# Patient Record
Sex: FEMALE | Race: BLACK OR AFRICAN AMERICAN | NOT HISPANIC OR LATINO | Employment: FULL TIME | URBAN - METROPOLITAN AREA
[De-identification: names, ages, dates, MRNs, and addresses within clinical notes are randomized per-mention and may not be internally consistent; named-entity substitution may affect disease eponyms.]

---
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
## 2019-08-08 ENCOUNTER — OFFICE VISIT (OUTPATIENT)
Dept: INTERNAL MEDICINE | Facility: CLINIC | Age: 19
End: 2019-08-08
Payer: COMMERCIAL

## 2019-08-08 VITALS
WEIGHT: 205 LBS | SYSTOLIC BLOOD PRESSURE: 122 MMHG | RESPIRATION RATE: 16 BRPM | DIASTOLIC BLOOD PRESSURE: 72 MMHG | TEMPERATURE: 98.2 F | BODY MASS INDEX: 30.36 KG/M2 | HEIGHT: 69 IN | HEART RATE: 66 BPM | OXYGEN SATURATION: 99 %

## 2019-08-08 DIAGNOSIS — N64.59 BREAST SYMPTOM: Primary | ICD-10-CM

## 2019-08-08 PROCEDURE — 99213 OFFICE O/P EST LOW 20 MIN: CPT | Performed by: PHYSICIAN ASSISTANT

## 2019-08-08 ASSESSMENT — MIFFLIN-ST. JEOR: SCORE: 1751.31

## 2019-08-08 NOTE — PROGRESS NOTES
"Subjective     La Whyte is a 19 year old female who presents to clinic today for the following health issues:    HPI   Breast bumps       Duration: 2 days    Description (location/character/radiation): Right breast around areola, pain and discomfort    She denies fever     Intensity:  mild    Accompanying signs and symptoms: They are painful    History (similar episodes/previous evaluation): None    Precipitating or alleviating factors: None    Therapies tried and outcome: None       Reviewed and updated as needed this visit by Provider  Meds  Problems             Objective    /72   Pulse 66   Temp 98.2  F (36.8  C) (Oral)   Resp 16   Ht 1.74 m (5' 8.5\")   Wt 93 kg (205 lb)   LMP 07/25/2019 (Approximate)   SpO2 99%   BMI 30.72 kg/m    Body mass index is 30.72 kg/m .  Physical Exam   GENERAL: healthy, alert and no distress  BREAST: right breast examined, small area about an M&M in size in area areola felt that is soft, mobile and painful to touch, no erythema or warmth    Diagnostic Test Results:  Labs reviewed in Epic        Assessment & Plan     1. Breast symptom  - reviewed case with MD in clinic  - plan for cool compresses and NSAIDs with close monitoring over the next week  - if no resolution, proceed with US  -  Reviewed symptoms to watch for if this occurs or symptoms worsen, urgent follow up       Return in about 1 week (around 8/15/2019), or if symptoms worsen or fail to improve, for breast symptoms .    Maya Ortega PA-C  [facility]    "

## 2019-11-08 ENCOUNTER — HEALTH MAINTENANCE LETTER (OUTPATIENT)
Age: 19
End: 2019-11-08

## 2020-02-23 ENCOUNTER — HEALTH MAINTENANCE LETTER (OUTPATIENT)
Age: 20
End: 2020-02-23

## 2020-03-01 ENCOUNTER — OFFICE VISIT (OUTPATIENT)
Dept: FAMILY MEDICINE | Facility: CLINIC | Age: 20
End: 2020-03-01
Payer: COMMERCIAL

## 2020-03-01 VITALS
HEART RATE: 74 BPM | SYSTOLIC BLOOD PRESSURE: 116 MMHG | TEMPERATURE: 98.1 F | BODY MASS INDEX: 28.29 KG/M2 | WEIGHT: 191 LBS | HEIGHT: 69 IN | OXYGEN SATURATION: 98 % | DIASTOLIC BLOOD PRESSURE: 82 MMHG

## 2020-03-01 DIAGNOSIS — F41.9 ANXIETY: ICD-10-CM

## 2020-03-01 DIAGNOSIS — R12 HEARTBURN: ICD-10-CM

## 2020-03-01 DIAGNOSIS — R53.83 FATIGUE, UNSPECIFIED TYPE: Primary | ICD-10-CM

## 2020-03-01 DIAGNOSIS — R11.0 NAUSEA: ICD-10-CM

## 2020-03-01 LAB — HEMOGLOBIN: 13.2 G/DL (ref 11.7–15.7)

## 2020-03-01 PROCEDURE — 99214 OFFICE O/P EST MOD 30 MIN: CPT | Performed by: PHYSICIAN ASSISTANT

## 2020-03-01 PROCEDURE — 36415 COLL VENOUS BLD VENIPUNCTURE: CPT | Performed by: PHYSICIAN ASSISTANT

## 2020-03-01 RX ORDER — ONDANSETRON 4 MG/1
4 TABLET, ORALLY DISINTEGRATING ORAL ONCE
Status: COMPLETED | OUTPATIENT
Start: 2020-03-01 | End: 2020-03-01

## 2020-03-01 RX ADMIN — ONDANSETRON 4 MG: 4 TABLET, ORALLY DISINTEGRATING ORAL at 12:21

## 2020-03-01 ASSESSMENT — MIFFLIN-ST. JEOR: SCORE: 1695.75

## 2020-03-01 NOTE — PATIENT INSTRUCTIONS
Take over the counter Pepcid and Tums as needed for heartburn.   Please follow up with a therapist as needed for anxiety.   Follow up with your primary care physician for further evaluation of heartburn.    Patient Education     Discharge Instructions for Gastroesophageal Reflux Disease (GERD)  Gastroesophageal reflux disease (GERD) is a backflow of acid from the stomach into the swallowing tube (esophagus).  Home care  These home care steps can help you manage GERD:    Maintain a healthy weight. Get help to lose any extra pounds.    Avoid lying down after meals.    Avoid eating late at night.    Elevate the head of your bed by 6 inches. You can do this by placing wooden blocks or bed risers under the head of your bed.    Avoid wearing tight-fitting clothes.    Avoid foods that might irritate your stomach, such as the following:  ? Alcohol  ? Fat  ? Chocolate  ? Caffeine  ? Spearmint or peppermint    Talk to your healthcare provider if you are taking any of the following medicines. These medicines can make GERD symptoms worse:  ? Calcium channel blockers  ? Theophylline  ? Anticholinergic medicines, such as oxybutynin and benzatropine    Begin an exercise program. Ask your healthcare provider how to get started. You can benefit from simple activities, such as walking or gardening.    Break the smoking habit. Enroll in a stop-smoking program to improve your chances of success.    Limit alcohol intake to no more than 2 drinks a day.    Take your medicines exactly as directed. Don t skip doses.    Avoid over-the-counter nonsteroidal anti-inflammatory medicines, such as aspirin and ibuprofen, unless recommended by your healthcare provider for certain conditions.     If possible, avoid nitrates (heart medicines, such as nitroglycerin and isosorbide dinitrate ).  Follow-up care  Make a follow-up appointment as directed by our staff.     When to call the healthcare provider  Call your healthcare provider immediately if you  have any of the following:    Trouble swallowing    Pain when swallowing    Feeling of food caught in your chest or throat    Pain in the neck, chest, or back    Heartburn that causes you to vomit    Vomiting blood    Black or tarry stools (from digested blood)    More saliva (watering of the mouth) than usual    Weight loss of more than 3% to 5% of your total body weight in a month    Hoarseness or sore throat that won t go away    Choking, coughing, or wheezing   Date Last Reviewed: 7/1/2016 2000-2019 [employer]. [address] [place] [address]. All rights reserved. This information is not intended as a substitute for professional medical care. Always follow your healthcare professional's instructions.

## 2020-03-01 NOTE — PROGRESS NOTES
URGENT CARE VISIT:    SUBJECTIVE:   La Whyte is a 19 year old female who presents with Nausea for 4 days and heartburn. Pain is improved by antacids and worsened by nothing. Associated symptoms include nausea. Last BM yesterday. She denies vomiting, diarrhea, constipation, dysuria, frequency, hematuria, fever and chills.  Appetite is decreased, but she has been able to drink fluids and smoothies. Risk factors include NSAIDs she takes occasionally for headaches (400mg Advil 1-2 times a week) and caffiene. Abdominal surgical history includes appendectomy when she was 9. LMP currently, normal for her. Denies sexual activity, she states she has never had sex.   Patient also reports anxiety for the last 4 days and some loose stools. She does not note a trigger or source of this anxiety.     Patient also reports increased fatigue and would like to check for anemia.  Patient's sister has a history of panic attacks.   Patient works seasonally at [employer] and is a full time student at the [employer].    PMH:   Past Medical History:   Diagnosis Date     Appendicitis with perforation 3/4/2009     Allergies: Pollen extract  Medications:   No current outpatient medications on file.     Social History:   Social History     Socioeconomic History     Marital status: Single     Spouse name: Not on file     Number of children: Not on file     Years of education: Not on file     Highest education level: Not on file   Occupational History     Not on file   Social Needs     Financial resource strain: Not on file     Food insecurity:     Worry: Not on file     Inability: Not on file     Transportation needs:     Medical: Not on file     Non-medical: Not on file   Tobacco Use     Smoking status: Never Smoker     Smokeless tobacco: Never Used     Tobacco comment: non smoking home   Substance and Sexual Activity     Alcohol use: No     Drug use: No     Sexual activity: Never   Lifestyle     Physical activity:     Days per week: Not on file  "    Minutes per session: Not on file     Stress: Not on file   Relationships     Social connections:     Talks on phone: Not on file     Gets together: Not on file     Attends Restorationism service: Not on file     Active member of club or organization: Not on file     Attends meetings of clubs or organizations: Not on file     Relationship status: Not on file     Intimate partner violence:     Fear of current or ex partner: Not on file     Emotionally abused: Not on file     Physically abused: Not on file     Forced sexual activity: Not on file   Other Topics Concern     Parent/sibling w/ CABG, MI or angioplasty before 65F 55M? Not Asked   Social History Narrative     Not on file       ROS: ROS otherwise found to be negative except as noted above.     OBJECTIVE:  /82   Pulse 74   Temp 98.1  F (36.7  C) (Oral)   Ht 1.753 m (5' 9\")   Wt 86.6 kg (191 lb)   SpO2 98%   BMI 28.21 kg/m    GENERAL APPEARANCE: healthy, alert and no distress  EYES: EOMI,  PERRL, conjunctiva clear  RESP: lungs clear to auscultation - no rales, rhonchi or wheezes  CV: regular rates and rhythm, normal S1 S2, no murmur noted  ABDOMEN:  soft, nontender, no HSM or masses and bowel sounds normal  SKIN: no suspicious lesions or rashes    LABS:  Results for orders placed or performed in visit on 03/01/20   Hemoglobin POCT     Status: None   Result Value Ref Range    Hemoglobin 13.2 11.7 - 15.7 g/dL   WNL    ASSESSMENT:     ICD-10-CM    1. Fatigue, unspecified type R53.83 Hemoglobin POCT     CANCELED: Hemoglobin   2. Nausea R11.0 ondansetron (ZOFRAN-ODT) ODT tab 4 mg     Hemoglobin POCT   3. Heartburn R12    4. Anxiety F41.9         PLAN:  Patient Instructions   Take over the counter Pepcid and Tums as needed for heartburn.   Please follow up with a therapist as needed for anxiety.   Follow up with your primary care physician for further evaluation of heartburn.    Patient verbalized understanding and is agreeable to plan. The patient was " discharged ambulatory and in stable condition.    JAYDEN Whiting...................  3/1/2020   11:52 AM

## 2020-12-06 ENCOUNTER — HEALTH MAINTENANCE LETTER (OUTPATIENT)
Age: 20
End: 2020-12-06

## 2020-12-30 ENCOUNTER — TELEPHONE (OUTPATIENT)
Dept: BEHAVIORAL HEALTH | Facility: CLINIC | Age: 20
End: 2020-12-30

## 2020-12-30 NOTE — TELEPHONE ENCOUNTER
Reason for call: Writer called patient for today's appointment.    Outcome: Patient did not respond either time and a message was left with patient about today's appointment.    Follow-up: Patient was given number for counseling center should they wish to reschedule.    ARETHA PeñaSW

## 2021-01-04 ENCOUNTER — VIRTUAL VISIT (OUTPATIENT)
Dept: PSYCHOLOGY | Facility: CLINIC | Age: 21
End: 2021-01-04
Payer: COMMERCIAL

## 2021-01-04 DIAGNOSIS — F33.1 MODERATE RECURRENT MAJOR DEPRESSION (H): Primary | ICD-10-CM

## 2021-01-04 PROCEDURE — 90791 PSYCH DIAGNOSTIC EVALUATION: CPT | Mod: 95 | Performed by: PSYCHOLOGIST

## 2021-01-04 ASSESSMENT — COLUMBIA-SUICIDE SEVERITY RATING SCALE - C-SSRS
6. HAVE YOU EVER DONE ANYTHING, STARTED TO DO ANYTHING, OR PREPARED TO DO ANYTHING TO END YOUR LIFE?: NO
5. HAVE YOU STARTED TO WORK OUT OR WORKED OUT THE DETAILS OF HOW TO KILL YOURSELF? DO YOU INTEND TO CARRY OUT THIS PLAN?: NO
1. IN THE PAST MONTH, HAVE YOU WISHED YOU WERE DEAD OR WISHED YOU COULD GO TO SLEEP AND NOT WAKE UP?: NO
3. HAVE YOU BEEN THINKING ABOUT HOW YOU MIGHT KILL YOURSELF?: NO
4. HAVE YOU HAD THESE THOUGHTS AND HAD SOME INTENTION OF ACTING ON THEM?: NO
4. HAVE YOU HAD THESE THOUGHTS AND HAD SOME INTENTION OF ACTING ON THEM?: NO
TOTAL  NUMBER OF INTERRUPTED ATTEMPTS PAST 3 MONTHS: NO
ATTEMPT PAST THREE MONTHS: NO
2. HAVE YOU ACTUALLY HAD ANY THOUGHTS OF KILLING YOURSELF LIFETIME?: NO
5. HAVE YOU STARTED TO WORK OUT OR WORKED OUT THE DETAILS OF HOW TO KILL YOURSELF? DO YOU INTEND TO CARRY OUT THIS PLAN?: NO
TOTAL  NUMBER OF ABORTED OR SELF INTERRUPTED ATTEMPTS PAST LIFETIME: NO
TOTAL  NUMBER OF INTERRUPTED ATTEMPTS LIFETIME: NO
2. HAVE YOU ACTUALLY HAD ANY THOUGHTS OF KILLING YOURSELF?: NO
6. HAVE YOU EVER DONE ANYTHING, STARTED TO DO ANYTHING, OR PREPARED TO DO ANYTHING TO END YOUR LIFE?: NO
1. IN THE PAST MONTH, HAVE YOU WISHED YOU WERE DEAD OR WISHED YOU COULD GO TO SLEEP AND NOT WAKE UP?: NO
ATTEMPT LIFETIME: NO
TOTAL  NUMBER OF ABORTED OR SELF INTERRUPTED ATTEMPTS PAST 3 MONTHS: NO

## 2021-01-04 ASSESSMENT — ANXIETY QUESTIONNAIRES
6. BECOMING EASILY ANNOYED OR IRRITABLE: MORE THAN HALF THE DAYS
5. BEING SO RESTLESS THAT IT IS HARD TO SIT STILL: NOT AT ALL
3. WORRYING TOO MUCH ABOUT DIFFERENT THINGS: NOT AT ALL
1. FEELING NERVOUS, ANXIOUS, OR ON EDGE: SEVERAL DAYS
GAD7 TOTAL SCORE: 4
7. FEELING AFRAID AS IF SOMETHING AWFUL MIGHT HAPPEN: NOT AT ALL
2. NOT BEING ABLE TO STOP OR CONTROL WORRYING: SEVERAL DAYS

## 2021-01-04 ASSESSMENT — PATIENT HEALTH QUESTIONNAIRE - PHQ9
5. POOR APPETITE OR OVEREATING: NOT AT ALL
SUM OF ALL RESPONSES TO PHQ QUESTIONS 1-9: 17

## 2021-01-04 NOTE — PROGRESS NOTES
"               Progress Note - Initial Visit    Client Name:  La RIZO Obsiye Date:2021       Service Type: Individual     Visit Start Time: 1:02  Visit End Time: 1:54    Visit #: 1    Attendees: Client attended alone    Service Modality:  Phone Visit:      Provider verified identity through the following two step process.  Patient provided:  Patient     The patient has been notified of the following:      \"We have found that certain health care needs can be provided without the need for a face to face visit.  This service lets us provide the care you need with a phone conversation.       I will have full access to your [facility] medical record during this entire phone call.   I will be taking notes for your medical record.      Since this is like an office visit, we will bill your insurance company for this service.       There are potential benefits and risks of telephone visits (e.g. limits to patient confidentiality) that differ from in-person visits.?  Confidentiality still applies for telephone services, and nobody will record the visit.  It is important to be in a quiet, private space that is free of distractions (including cell phone or other devices) during the visit.??      If during the course of the call I believe a telephone visit is not appropriate, you will not be charged for this service\"     Consent has been obtained for this service by care team member: Yes        DATA:   Interactive Complexity: No   Crisis: No     Presenting Concerns/  Current Stressors:   Depression and isolation at home         ASSESSMENT:  Mental Status Assessment:  Appearance:   On phone, not visible   Eye Contact:   On phone, not visible   Psychomotor Behavior: On phone, not visible   Attitude:   Interested Friendly Pleasant Attentive  Orientation:   All  Speech   Rate / Production: Normal/ Responsive   Volume:  Normal   Mood:    Depressed   Affect:    Appropriate   Thought Content:  Clear   Thought Form:  Coherent  " Goal Directed   Insight:    Good       Safety Issues and Plan for Safety and Risk Management:     [place] Suicide Severity Rating Scale (Lifetime/Recent)  [place] Suicide Severity Rating (Lifetime/Recent) 1/4/2021   1. Wish to be Dead (Lifetime) No   1. Wish to be Dead (Recent) No   2. Non-Specific Active Suicidal Thoughts (Lifetime) No   2. Non-Specific Active Suicidal Thoughts (Recent) No   3. Active Suicidal Ideation with any Methods (Not Plan) Without Intent to Act (Lifetime) No   3. Active Sucidal Ideation with any Methods (Not Plan) Without Intent to Act (Recent) No   4. Active Suicidal Ideation with Some Intent to Act, Without Specific Plan (Lifetime) No   4. Active Suicidal Ideation with Some Intent to Act, Without Specific Plan (Recent) No   5. Active Suicidal Ideation with Specific Plan and Intent (Lifetime) No   5. Active Suicidal Ideation with Specific Plan and Intent (Recent) No   Most Severe Ideation Rating (Lifetime) NA   Frequency (Lifetime) NA   Duration (Lifetime) NA   Controllability (Lifetime) NA   Protective Factors  (Lifetime) NA   Reasons for Ideation (Lifetime) NA   Most Severe Ideation Rating (Past Month) NA   Frequency (Past Month) NA   Duration (Past Month) NA   Controllability (Past Month) NA   Protective Factors (Past Month) NA   Reasons for Ideation (Past Month) NA   Actual Attempt (Lifetime) No   Actual Attempt (Past 3 Months) No   Has subject engaged in non-suicidal self-injurious behavior? (Lifetime) No   Has subject engaged in non-suicidal self-injurious behavior? (Past 3 Months) No   Interrupted Attempts (Lifetime) No   Interrupted Attempts (Past 3 Months) No   Aborted or Self-Interrupted Attempt (Lifetime) No   Aborted or Self-Interrupted Attempt (Past 3 Months) No   Preparatory Acts or Behavior (Lifetime) No   Preparatory Acts or Behavior (Past 3 Months) No   Most Recent Attempt Actual Lethality Code NA   Most Lethal Attempt Actual Lethality Code NA   Initial/First Attempt  Actual Lethality Code NA     Patient denies current fears or concerns for personal safety.  Patient denies current or recent suicidal ideation or behaviors.  Patient denies current or recent homicidal ideation or behaviors.  Patient denies current or recent self injurious behavior or ideation.  Patient denies other safety concerns.  Recommended that patient call 911 or go to the local ED should there be a change in any of these risk factors.  Patient reports there are no firearms in the house.     Diagnostic Criteria:  CRITERIA (A-C) REPRESENT A MAJOR DEPRESSIVE EPISODE - SELECT THESE CRITERIA  A) Recurrent episode(s) - symptoms have been present during the same 2-week period and represent a change from previous functioning 5 or more symptoms (required for diagnosis)   - Depressed mood. Note: In children and adolescents, can be irritable mood.     - Diminished interest or pleasure in all, or almost all, activities.    - Increased sleep.    - Psychomotor activity retardation.    - Fatigue or loss of energy.    - Feelings of worthlessness or inappropriate and excessive guilt.       DSM5 Diagnoses: (Sustained by DSM5 Criteria Listed Above)  Diagnoses: 296.23 (F32.2) Major Depressive Disorder, Single Episode, Severe _  Psychosocial & Contextual Factors: Pressure to acheive. Dropped out of school, no friends  WHODAS 2.0 (12 item): No flowsheet data found.  Intervention:   CBT- Patient was given cognitive distortions list to review and process at next session, CBT- Patient was educated on the CBT model and asked to bring in examples at next session, DBT- Patient was educated on the Interpersonal Effectiveness Skill of noticing her own thoughts differing from others, Mindfulness- Patient was educated on relaxation and mindfulness techniques and Educated on treatment planning and started identifying goals and interventions for treatment plan  Collateral Reports Completed:  Routed note to PCP      PLAN: (Homework,  other):  1. Provider will continue Diagnostic Assessment.  Patient was given the following to do until next session:  Look at Abner White, identify own decisions.    2. Provider recommended the following referrals: Abner White books. We discussed medication option is and Mabsud is nervous about family addictive tendencies and wants to do talk therapy.      3.  Safety plan created.  Provider recommended that patient  Call and get help if SI occurs.       Pina Ball, ROSI  January 4, 2021

## 2021-01-05 ASSESSMENT — ANXIETY QUESTIONNAIRES: GAD7 TOTAL SCORE: 4

## 2021-01-19 ENCOUNTER — VIRTUAL VISIT (OUTPATIENT)
Dept: PSYCHOLOGY | Facility: CLINIC | Age: 21
End: 2021-01-19
Payer: COMMERCIAL

## 2021-01-19 DIAGNOSIS — F33.1 MODERATE RECURRENT MAJOR DEPRESSION (H): Primary | ICD-10-CM

## 2021-01-19 PROCEDURE — 90791 PSYCH DIAGNOSTIC EVALUATION: CPT | Mod: 95 | Performed by: PSYCHOLOGIST

## 2021-01-19 NOTE — PROGRESS NOTES
"[facility]   Provider Name:  Pina Ball     Credentials:  STEFFEN ARANDA    PATIENT'S NAME: La Whyte  PREFERRED NAME: La  PRONOUNS:     she/her  MRN: [id number]  : 2000   ACCT. NUMBER:  [id number]  DATE OF SERVICE: 21  START TIME: 3:00  END TIME: 3:52  PREFERRED PHONE: [phone]  May we leave a program related message: Yes  SERVICE MODALITY:  Phone Visit:      Provider verified identity through the following two step process.  Patient provided:  Patient     The patient has been notified of the following:      \"We have found that certain health care needs can be provided without the need for a face to face visit.  This service lets us provide the care you need with a phone conversation.       I will have full access to your [facility] medical record during this entire phone call.   I will be taking notes for your medical record.      Since this is like an office visit, we will bill your insurance company for this service.       There are potential benefits and risks of telephone visits (e.g. limits to patient confidentiality) that differ from in-person visits.?  Confidentiality still applies for telephone services, and nobody will record the visit.  It is important to be in a quiet, private space that is free of distractions (including cell phone or other devices) during the visit.??      If during the course of the call I believe a telephone visit is not appropriate, you will not be charged for this service\"     Consent has been obtained for this service by care team member: Yes     UNIVERSAL ADULT Mental Health DIAGNOSTIC ASSESSMENT      Identifying Information:  Patient is a 20 year old, Trin[place] (Mom) and [place]ian (Dad).  The pronoun use throughout this assessment reflects the patient's chosen pronoun.  Patient was referred for an assessment by self and mom.  Patient attended the session alone.     Chief Complaint:   The reason for seeking services at this time is: \" Depressed " "since young, no friends, dropped out of school.\"   The problem(s) began \"since young.\" Patient has attempted to resolve these concerns in the past through trying to be perfect, talk ot family, dropped out of school and out of work.    Social/Family History:  Patient reported they grew up in [place], [place].  They were raised by biological parents.  Parents stayed ..   Patient reported that their childhood was a lot of pressure to over-achieve.  Patient described their current relationships with family of origin as strained, mom is trying to help.  \"I lived for them.\"    The patient describes their cultural background as multicultural and education focused. Not highly Sikh.  Cultural influences and impact on patient's life structure, values, norms, and healthcare: Social Orientation: and education focus.  Contextual influences on patient's health include: Family Factors - education, tried ot be perfect and Economic Factors - parents fearful of poverty again.    These factors will be addressed in the Preliminary Treatment plan.  Patient identified their preferred language to be English. Patient reported they does not need the assistance of an  or other support involved in therapy.     Patient reported had no significant delays in developmental tasks.   Patient's highest education level was some college. Patient identified the following learning problems: none reported.  Modifications will not be used to assist communication in therapy.  Patient reports they are  able to understand written materials.    Patient reported the following relationship history never dated.  Patient's current relationship status is on her own for her life since parents don't want her to.   Patient identified their sexual orientation as heterosexual.  Patient reported having zero child(joycelyn). Patient identified parents, mother and siblings as part of their support system.  Patient identified the quality of these " relationships as good.      Patient's current living/housing situation involves staying with parents.  They live with parents and siblings and they report that housing is stable.     Patient is currently unemployed and wants to go to school when she figures out her career.  Patient reports their finances are obtained through parents and unemployment.  Patient does identify finances as a current stressor.      Patient reported that they have not been involved with the legal system.  Patient denies being on probation / parole / under the jurisdiction of the court.    Patient's Strengths and Limitations:  Patient identified the following strengths or resources that will help them succeed in treatment: commitment to health and well being, intelligence, motivation, sense of humor and work ethic. Things that may interfere with the patient's success in treatment include: few friends, financial hardship and lack of family support.     Personal and Family Medical History:   Patient does report a family history of mental health concerns. Sister has anxiety. Patient reports family history includes Asthma in her brother; Diabetes in her maternal aunt and paternal uncle; Hypertension in her maternal aunt and paternal uncle; Multiple Sclerosis in her maternal aunt; Thyroid Disease in her maternal aunt; Unknown/Adopted in her maternal grandmother, paternal grandfather, and paternal grandmother..     Patient does report Mental Health Diagnosis and/or Treatment.  Patient Patient reported the following previous diagnoses which include(s): Depression and and distressed about parents.  Patient reported symptoms began since young.   Patient has not received mental health services in the past: none.  Psychiatric Hospitalizations: None.  Patient denies a history of civil commitment.  Currently, patient is not receiving other mental health services.  These include none and she does not want meds.           Patient has had a physical exam  to rule out medical causes for current symptoms.  Date of last physical exam was within the past year. Client was encouraged to follow up with PCP if symptoms were to develop. The patient has a [facility] Primary Care Provider, who is named Emily Street..  Patient reports the following current medical concerns: obesity.  Patient denies any issues with pain..   There are significant appetite / nutritional concerns / weight changes. Obesity  Patient does not report a history of head injury / trauma / cognitive impairment.      Patient reports current meds as:   No outpatient medications have been marked as taking for the 1/19/21 encounter (Appointment) with Pina Ball LP.       Medication Adherence:  Patient reports not wanting meds.    Patient Allergies:    Allergies   Allergen Reactions     Pollen Extract        Medical History:    Past Medical History:   Diagnosis Date     Appendicitis with perforation 3/4/2009         Current Mental Status Exam:   Appearance:                            On phone, not visible   Eye Contact:                           On phone, not visible   Psychomotor Behavior:          On phone, not visible   Attitude:                                   Interested Friendly Pleasant Attentive  Orientation:                             All  Speech              Rate / Production:       Normal/ Responsive              Volume:                       Normal   Mood:                                      Depressed   Affect:                                      Appropriate   Thought Content:                    Clear   Thought Form:                        Coherent  Goal Directed   Insight:                                     Good     Rating Scales:    PHQ9:    PHQ-9 SCORE 1/4/2021   PHQ-9 Total Score 17   ;    GAD7:    ANNMARIE-7 SCORE 1/4/2021   Total Score 4     CGI:     First:Considering your total clinical experience with this particular patient population, how severe are the patient's symptoms at  this time?: 6 (1/4/2021  2:52 PM)  ;    Most recentNo data recorded    Substance Use:  Patient did report a family history of substance use concerns; see medical history section for details. Uncle has  Patient has not received chemical dependency treatment in the past.  Patient has not ever been to detox.      Patient is not currently receiving any chemical dependency treatment. Patient reported the following problems as a result of their substance use: none - no use.    Patient denies using alcohol.  Patient denies using tobacco.  Patient denies using marijuana.  Patient reports using caffeine 3 times per week and drinks 1 at a time. Patient started using caffeine at age youth.  Patient reports using/abusing the following substance(s). Patient reported no other substance use.     CAGE- AID:  No flowsheet data found.    Substance Use: No symptoms    Based on the negative CAGE score 0 and clinical interview there  are not indications of drug or alcohol abuse.      Significant Losses / Trauma / Abuse / Neglect Issues:   Patient did not serve in the .  There are indications or report of significant loss, trauma, abuse or neglect issues related to: family stress of achievement pressure.  Concerns for possible neglect are not present.     Safety Assessment:   Current Safety Concerns:  [place] Suicide Severity Rating Scale (Lifetime/Recent)  [place] Suicide Severity Rating (Lifetime/Recent) 1/4/2021   1. Wish to be Dead (Lifetime) No   1. Wish to be Dead (Recent) No   2. Non-Specific Active Suicidal Thoughts (Lifetime) No   2. Non-Specific Active Suicidal Thoughts (Recent) No   3. Active Suicidal Ideation with any Methods (Not Plan) Without Intent to Act (Lifetime) No   3. Active Sucidal Ideation with any Methods (Not Plan) Without Intent to Act (Recent) No   4. Active Suicidal Ideation with Some Intent to Act, Without Specific Plan (Lifetime) No   4. Active Suicidal Ideation with Some Intent to Act, Without  Specific Plan (Recent) No   5. Active Suicidal Ideation with Specific Plan and Intent (Lifetime) No   5. Active Suicidal Ideation with Specific Plan and Intent (Recent) No   Most Severe Ideation Rating (Lifetime) NA   Frequency (Lifetime) NA   Duration (Lifetime) NA   Controllability (Lifetime) NA   Protective Factors  (Lifetime) NA   Reasons for Ideation (Lifetime) NA   Most Severe Ideation Rating (Past Month) NA   Frequency (Past Month) NA   Duration (Past Month) NA   Controllability (Past Month) NA   Protective Factors (Past Month) NA   Reasons for Ideation (Past Month) NA   Actual Attempt (Lifetime) No   Actual Attempt (Past 3 Months) No   Has subject engaged in non-suicidal self-injurious behavior? (Lifetime) No   Has subject engaged in non-suicidal self-injurious behavior? (Past 3 Months) No   Interrupted Attempts (Lifetime) No   Interrupted Attempts (Past 3 Months) No   Aborted or Self-Interrupted Attempt (Lifetime) No   Aborted or Self-Interrupted Attempt (Past 3 Months) No   Preparatory Acts or Behavior (Lifetime) No   Preparatory Acts or Behavior (Past 3 Months) No   Most Recent Attempt Actual Lethality Code NA   Most Lethal Attempt Actual Lethality Code NA   Initial/First Attempt Actual Lethality Code NA     Patient denies current homicidal ideation and behaviors.  Patient denies current self-injurious ideation and behaviors.    Patient denied risk behaviors associated with substance use.  Patient denies any high risk behaviors associated with mental health symptoms.  Patient reports the following current concerns for their personal safety: None.  Patient reports there are not  firearms in the house. The firearms are secured in a locked space.     History of Safety Concerns:  Patient denied a history of homicidal ideation.     Patient denied a history of personal safety concerns.    Patient denied a history of assaultive behaviors.    Patient denied a history of sexual assault behaviors.     Patient  denied a history of risk behaviors associated with substance use.  Patient denies any history of high risk behaviors associated with mental health symptoms.  Patient reports the following protective factors: positive relationships positive family connections, dedication to family/friends, safe and stable environment, effectively controls impulses, regular physical activity, secure attachment, abstinence from substances and living with other people    Risk Plan:  See Recommendations for Safety and Risk Management Plan    Review of Symptoms per patient report:  Depression: Change in sleep, Lack of interest, Change in appetite, Suicidal ideation, Feelings of hopelessness, Low self-worth, Ruminations, Feeling sad, down, or depressed, Withdrawn and thinks people think bad things about her  Gema:  No Symptoms  Psychosis: No Symptoms  Anxiety: Excessive worry, Nervousness, Social anxiety, Ruminations and Irritability  Panic:  No symptoms  Post Traumatic Stress Disorder:  No Symptoms   Eating Disorder: Binging and over wt  ADD / ADHD:  No symptoms  Conduct Disorder: No symptoms  Autism Spectrum Disorder: No symptoms  Obsessive Compulsive Disorder: Cleaning and everyday    Patient reports the following compulsive behaviors and treatment history: not identified.      Diagnostic Criteria:   CRITERIA (A-C) REPRESENT A MAJOR DEPRESSIVE EPISODE - SELECT THESE CRITERIA  A) Recurrent episode(s) - symptoms have been present during the same 2-week period and represent a change from previous functioning 5 or more symptoms (required for diagnosis)   - Depressed mood. Note: In children and adolescents, can be irritable mood.     - Diminished interest or pleasure in all, or almost all, activities.    - Significant weight gainincrease in appetite.    - Increased sleep.    - Psychomotor activity retardation.    - Fatigue or loss of energy.    - Feelings of worthlessness or inappropriate and excessive guilt.    - Recurrent thoughts of  death (not just fear of dying), recurrent suicidal ideation without a specific plan, or a suicide attempt or a specific plan for committing suicide.     Functional Status:  Patient reports the following functional impairments: educational activities, home life with family stress, management of the household and or completion of tasks, relationship(s), social interactions, work / vocational responsibilities and family stress.     WHODAS: No flowsheet data found.    Clinical Summary:  1. Reason for assessment: Depression and career, education stress  .  2. Psychosocial, Cultural and Contextual Factors: education pressure and self judgement  .  3. Principal DSM5 Diagnoses  (Sustained by DSM5 Criteria Listed Above):    296.32 (F33.1) Major Depressive Disorder, Recurrent Episode, Moderate Depression With anxious distress.  4. Other Diagnoses that is relevant to services:   296.22 (F32.1)  Major Depressive Disorder, Recurrent, Moderate With anxious distress.  5. Provisional Diagnosis:  296.32 (F33.1) Major Depressive Disorder, Recurrent Episode, Moderate _ as evidenced by low motivation, out of school and career .  6. Prognosis: Expect Improvement.  7. Likely consequences of symptoms if not treated: lack of progressing and more hopeless.  8. Client strengths include:  caring, committed to sobriety, empathetic, goal-focused, good listener, insightful, intelligent, motivated and open to learning .     Recommendations:     1. Plan for Safety and Risk Management:   Recommended that patient call 911 or go to the local ED should there be a change in any of these risk factors..          Report to child / adult protection services was NA.     2. Patient's identified caitlin / Temple / spiritual influences will be incorporated into care by discussion ethnic concerns will be incorporated into care by negociate needs cultural concerns will be addressed by discussion.     3. Initial Treatment will focus on:    Depressed Mood - and  building a career  Relational Problems related to: Parent / child conflict.     4. Resources/Service Plan:    services are not indicated.   Modifications to assist communication are not indicated.   Additional disability accommodations are not indicated.      5. Collaboration:   Collaboration / coordination of treatment will be initiated with the following  support professionals: none.      6.  Referrals:   The following referral(s) will be initiated: none. Next Scheduled Appointment: 2 weeks.     A Release of Information has been obtained for the following: none.    7. LEOVN:    LEVON:  Discussed the general effects of drugs and alcohol on health and well-being.     8. Records:   These were not available for review at time of assessment.   Information in this assessment was obtained from the medical record and  provided by patient who is a good historian.    Patient will have open access to their mental health medical record.        Provider Name/ Credentials:  Pina Ball MA, LP  January 19, 2021

## 2021-02-03 ENCOUNTER — TELEPHONE (OUTPATIENT)
Dept: PSYCHOLOGY | Facility: CLINIC | Age: 21
End: 2021-02-03

## 2021-02-03 NOTE — TELEPHONE ENCOUNTER
Attempted to contact La SALLIE Pato 3 times regarding today's appointment but was unable to reach her.  I left a message about this appointment and directed her to intake if she wishes to cancel her future appt . I reminded her of the attendance policy, if she misses another appt her future sessions will be cancelled and she can not return for 6 months.    She was encouraged to attend her appt.

## 2021-03-04 ENCOUNTER — FCC EXTENDED DOCUMENTATION (OUTPATIENT)
Dept: PSYCHOLOGY | Facility: CLINIC | Age: 21
End: 2021-03-04

## 2021-03-04 DIAGNOSIS — F33.1 MODERATE RECURRENT MAJOR DEPRESSION (H): Primary | ICD-10-CM

## 2021-03-04 NOTE — PROGRESS NOTES
"                    Discharge Summary  Multiple Sessions    Client Name: La Whyte MRN#: [id number] YOB: 2000      Intake / Discharge Date: 1/4/21 - 1/19/21  2 sessions    DSM5 Diagnoses:   Diagnoses:  296.32 (F33.1) Major Depressive Disorder, Recurrent Episode, Moderate Depression With anxious distress.  Psychosocial & Contextual Factors: Chronic depression, cultural differences, no friends  WHODAS 2.0 (12 item) Score: Not taken          Presenting Concern:  \" Depressed since young, no friends, dropped out of school.\"   The problem(s) began \"since young.\"    Reason for Discharge:  Client did not return      Disposition at Time of Last Encounter:   Comments:   No change. No risk.     Risk Management:   Client denies a history of suicidal ideation, suicide attempts, self-injurious behavior, homicidal ideation, homicidal behavior and and other safety concerns  Recommended that patient call 911 or go to the local ED should there be a change in any of these risk factors.      Referred To:  Jayashree did not return to therapy after the first session. I am closing her case until she is ready.  Pina Ball, ROSI   3/4/2021    "

## 2021-09-25 ENCOUNTER — HEALTH MAINTENANCE LETTER (OUTPATIENT)
Age: 21
End: 2021-09-25

## 2022-01-15 ENCOUNTER — HEALTH MAINTENANCE LETTER (OUTPATIENT)
Age: 22
End: 2022-01-15

## 2023-01-07 ENCOUNTER — HEALTH MAINTENANCE LETTER (OUTPATIENT)
Age: 23
End: 2023-01-07

## 2023-04-22 ENCOUNTER — HEALTH MAINTENANCE LETTER (OUTPATIENT)
Age: 23
End: 2023-04-22